# Patient Record
Sex: FEMALE | Race: WHITE | NOT HISPANIC OR LATINO | URBAN - METROPOLITAN AREA
[De-identification: names, ages, dates, MRNs, and addresses within clinical notes are randomized per-mention and may not be internally consistent; named-entity substitution may affect disease eponyms.]

---

## 2017-01-05 ENCOUNTER — IMPORTED ENCOUNTER (OUTPATIENT)
Dept: URBAN - METROPOLITAN AREA CLINIC 50 | Facility: CLINIC | Age: 77
End: 2017-01-05

## 2017-01-26 ENCOUNTER — IMPORTED ENCOUNTER (OUTPATIENT)
Dept: URBAN - METROPOLITAN AREA CLINIC 50 | Facility: CLINIC | Age: 77
End: 2017-01-26

## 2017-05-24 ENCOUNTER — IMPORTED ENCOUNTER (OUTPATIENT)
Dept: URBAN - METROPOLITAN AREA CLINIC 50 | Facility: CLINIC | Age: 77
End: 2017-05-24

## 2018-08-01 ENCOUNTER — IMPORTED ENCOUNTER (OUTPATIENT)
Dept: URBAN - METROPOLITAN AREA CLINIC 50 | Facility: CLINIC | Age: 78
End: 2018-08-01

## 2018-08-22 ENCOUNTER — IMPORTED ENCOUNTER (OUTPATIENT)
Dept: URBAN - METROPOLITAN AREA CLINIC 50 | Facility: CLINIC | Age: 78
End: 2018-08-22

## 2018-12-19 ENCOUNTER — IMPORTED ENCOUNTER (OUTPATIENT)
Dept: URBAN - METROPOLITAN AREA CLINIC 50 | Facility: CLINIC | Age: 78
End: 2018-12-19

## 2018-12-19 NOTE — PATIENT DISCUSSION
"""Continue Warm compresses both eyes once a day ."" ""Continue Baby shampoo both eyes once a day ."" ""Continue Erythromycin ointment both eyes once a week at bedtime ."""

## 2018-12-19 NOTE — PATIENT DISCUSSION
"Start Zaditor both eyes twice a day . Start Lotemax Gel both eyes .  ""Decrease Pazeo both eyes in the morning ."""

## 2018-12-19 NOTE — PATIENT DISCUSSION
"""Continue Restasis both eyes twice a day ."" ""Continue Artificial tears both eyes two - four times a day

## 2019-01-02 ENCOUNTER — IMPORTED ENCOUNTER (OUTPATIENT)
Dept: URBAN - METROPOLITAN AREA CLINIC 50 | Facility: CLINIC | Age: 79
End: 2019-01-02

## 2020-03-09 ENCOUNTER — IMPORTED ENCOUNTER (OUTPATIENT)
Dept: URBAN - METROPOLITAN AREA CLINIC 50 | Facility: CLINIC | Age: 80
End: 2020-03-09

## 2020-03-09 NOTE — PATIENT DISCUSSION
"""recheckd by Dr Chandni Uribe.  Recommend glaucoma testing before starting her on a glaucoma gtt. """

## 2020-03-17 ENCOUNTER — IMPORTED ENCOUNTER (OUTPATIENT)
Dept: URBAN - METROPOLITAN AREA CLINIC 50 | Facility: CLINIC | Age: 80
End: 2020-03-17

## 2020-08-07 ENCOUNTER — IMPORTED ENCOUNTER (OUTPATIENT)
Dept: URBAN - METROPOLITAN AREA CLINIC 50 | Facility: CLINIC | Age: 80
End: 2020-08-07

## 2020-08-10 ENCOUNTER — IMPORTED ENCOUNTER (OUTPATIENT)
Dept: URBAN - METROPOLITAN AREA CLINIC 50 | Facility: CLINIC | Age: 80
End: 2020-08-10

## 2020-09-16 ENCOUNTER — IMPORTED ENCOUNTER (OUTPATIENT)
Dept: URBAN - METROPOLITAN AREA CLINIC 50 | Facility: CLINIC | Age: 80
End: 2020-09-16

## 2020-09-16 NOTE — PATIENT DISCUSSION
"""Abscess drainage CAROLINE done todaywith patient written consent."" ""Start Erythromycin Ointment left eye at bedtime

## 2021-02-18 ENCOUNTER — IMPORTED ENCOUNTER (OUTPATIENT)
Dept: URBAN - METROPOLITAN AREA CLINIC 50 | Facility: CLINIC | Age: 81
End: 2021-02-18

## 2021-02-22 ENCOUNTER — IMPORTED ENCOUNTER (OUTPATIENT)
Dept: URBAN - METROPOLITAN AREA CLINIC 50 | Facility: CLINIC | Age: 81
End: 2021-02-22

## 2021-04-18 ASSESSMENT — TONOMETRY
OD_IOP_MMHG: 25
OD_IOP_MMHG: 18
OS_IOP_MMHG: 19
OD_IOP_MMHG: 20
OD_IOP_MMHG: 24
OS_IOP_MMHG: 25
OS_IOP_MMHG: 24
OS_IOP_MMHG: 24
OD_IOP_MMHG: 20
OD_IOP_MMHG: 22
OD_IOP_MMHG: 21
OS_IOP_MMHG: 19
OS_IOP_MMHG: 18
OS_IOP_MMHG: 17
OS_IOP_MMHG: 18
OD_IOP_MMHG: 18
OS_IOP_MMHG: 19
OS_IOP_MMHG: 20
OS_IOP_MMHG: 17
OD_IOP_MMHG: 18
OS_IOP_MMHG: 21
OS_IOP_MMHG: 22
OS_IOP_MMHG: 21
OD_IOP_MMHG: 18
OS_IOP_MMHG: 20
OD_IOP_MMHG: 20
OS_IOP_MMHG: 17
OD_IOP_MMHG: 21
OD_IOP_MMHG: 22
OD_IOP_MMHG: 24
OD_IOP_MMHG: 17
OD_IOP_MMHG: 19

## 2021-04-18 ASSESSMENT — VISUAL ACUITY
OD_SC: 20/25
OD_SC: 20/25-
OS_SC: 20/30+
OD_CC: J1+
OS_SC: 20/25
OS_CC: J1+
OS_SC: 20/25
OS_SC: 20/30+2
OD_BAT: 20/200
OD_SC: 20/20
OS_SC: 20/25
OS_BAT: 20/100
OD_SC: 20/25
OD_CC: J1+
OD_OTHER: 20/30. 20/50.
OD_SC: 20/25
OD_OTHER: 20/200. 20/400.
OD_SC: 20/30
OS_SC: 20/30
OD_OTHER: 20/200. >20/400.
OS_SC: 20/25-1
OS_BAT: 20/40
OD_BAT: 20/40+
OD_BAT: 20/200
OD_SC: 20/25-
OS_CC: J1+
OD_BAT: 20/30
OS_OTHER: 20/40. 20/40.
OS_SC: 20/25=
OD_SC: 20/25-
OS_OTHER: 20/100. 20/400.
OS_SC: 20/20-
OS_OTHER: 20/40-.
OD_CC: J1+
OS_BAT: 20/100
OS_OTHER: 20/100. >20/400.
OS_SC: 20/20
OS_BAT: 20/40-
OD_SC: 20/25
OD_SC: 20/25-2
OS_SC: 20/25
OS_CC: J1+
OD_SC: 20/20-
OS_PH: 20/25-2

## 2021-04-18 ASSESSMENT — PACHYMETRY
OD_CT_UM: 530
OS_CT_UM: 530
OD_CT_UM: 530
OS_CT_UM: 530
OS_CT_UM: 530
OD_CT_UM: 530
OS_CT_UM: 530
OS_CT_UM: 530
OD_CT_UM: 530
OD_CT_UM: 530
OS_CT_UM: 530
OD_CT_UM: 530

## 2021-12-10 ENCOUNTER — ESTABLISHED PATIENT (OUTPATIENT)
Dept: URBAN - METROPOLITAN AREA CLINIC 50 | Facility: CLINIC | Age: 81
End: 2021-12-10

## 2021-12-10 DIAGNOSIS — H00.14: ICD-10-CM

## 2021-12-10 PROCEDURE — 67800 REMOVE EYELID LESION: CPT

## 2021-12-10 PROCEDURE — 92012 INTRM OPH EXAM EST PATIENT: CPT

## 2021-12-10 ASSESSMENT — VISUAL ACUITY
OS_SC: 20/20
OD_SC: 20/25+-

## 2021-12-10 ASSESSMENT — TONOMETRY
OD_IOP_MMHG: 20
OS_IOP_MMHG: 20

## 2021-12-10 NOTE — PROCEDURE NOTE: CLINICAL
PROCEDURE NOTE: Excision Chalazion, Single Left Upper Lid. Diagnosis: Chalazion. Prior to treatment, the risks/benefits/alternatives were discussed. The patient wished to proceed with procedure. Informed consent was obtained. Once satisfactory anesthesia was achieved, the chalazion was clamped prior to performing an uncomplicated excision and expression until all visible debris was removed. Bleeding was controlled and the clamp was removed. The eye was pressure patched with antibiotic ointment. Patient tolerated the procedure well with no complications. [facility]

## 2021-12-10 NOTE — PATIENT DISCUSSION
Chalazion excision CAROLINE today. Consent signed. Erythromycin and pressure patch applied OS, to be removed in 1-2 hours. Continue GABRIELA-POLY-DEXA OS Qhs for 3 days.

## 2022-02-28 ENCOUNTER — COMPREHENSIVE EXAM (OUTPATIENT)
Dept: URBAN - METROPOLITAN AREA CLINIC 52 | Facility: CLINIC | Age: 82
End: 2022-02-28

## 2022-02-28 DIAGNOSIS — H00.14: ICD-10-CM

## 2022-02-28 DIAGNOSIS — E11.9: ICD-10-CM

## 2022-02-28 DIAGNOSIS — H04.123: ICD-10-CM

## 2022-02-28 DIAGNOSIS — H43.813: ICD-10-CM

## 2022-02-28 DIAGNOSIS — H26.493: ICD-10-CM

## 2022-02-28 PROCEDURE — 92014 COMPRE OPH EXAM EST PT 1/>: CPT

## 2022-02-28 PROCEDURE — 92015 DETERMINE REFRACTIVE STATE: CPT

## 2022-02-28 ASSESSMENT — VISUAL ACUITY
OD_SC: 20/20-2
OS_SC: 20/20
OU_SC: J1-

## 2022-02-28 ASSESSMENT — TONOMETRY
OD_IOP_MMHG: 20
OS_IOP_MMHG: 20
OD_IOP_MMHG: 19
OS_IOP_MMHG: 19

## 2022-02-28 NOTE — PATIENT DISCUSSION
Possible Chalazion excision CAROLINE today.  has a raise lesion appearance.  Patient states it has only been there 10 days max.   will try maxitrol ointment to area twice a day for 7 days then stop.  If this area recurs or does not go away it would be recommended that patient have a consult with an oculoplastic for a possible biopsy.  Patient does not want  Dr Magi Dinero.

## 2024-01-11 ENCOUNTER — COMPREHENSIVE EXAM (OUTPATIENT)
Dept: URBAN - METROPOLITAN AREA CLINIC 49 | Facility: LOCATION | Age: 84
End: 2024-01-11

## 2024-01-11 DIAGNOSIS — H26.493: ICD-10-CM

## 2024-01-11 DIAGNOSIS — H04.123: ICD-10-CM

## 2024-01-11 DIAGNOSIS — H40.053: ICD-10-CM

## 2024-01-11 DIAGNOSIS — H43.813: ICD-10-CM

## 2024-01-11 DIAGNOSIS — E11.9: ICD-10-CM

## 2024-01-11 PROCEDURE — 99214 OFFICE O/P EST MOD 30 MIN: CPT

## 2024-01-11 PROCEDURE — 92015 DETERMINE REFRACTIVE STATE: CPT

## 2024-01-11 ASSESSMENT — TONOMETRY
OD_IOP_MMHG: 20
OS_IOP_MMHG: 20
OD_IOP_MMHG: 21
OS_IOP_MMHG: 21

## 2024-01-11 ASSESSMENT — VISUAL ACUITY
OD_GLARE: 20/50
OS_SC: 20/25-2
OU_CC: J1+
OS_GLARE: 20/40
OD_GLARE: 20/50
OS_GLARE: 20/50
OD_SC: 20/25-2